# Patient Record
Sex: FEMALE | Race: ASIAN | NOT HISPANIC OR LATINO
[De-identification: names, ages, dates, MRNs, and addresses within clinical notes are randomized per-mention and may not be internally consistent; named-entity substitution may affect disease eponyms.]

---

## 2020-12-29 ENCOUNTER — TRANSCRIPTION ENCOUNTER (OUTPATIENT)
Age: 40
End: 2020-12-29

## 2021-01-26 PROBLEM — Z00.00 ENCOUNTER FOR PREVENTIVE HEALTH EXAMINATION: Status: ACTIVE | Noted: 2021-01-26

## 2021-01-27 ENCOUNTER — APPOINTMENT (OUTPATIENT)
Dept: MAMMOGRAPHY | Facility: CLINIC | Age: 41
End: 2021-01-27
Payer: MEDICAID

## 2021-01-27 ENCOUNTER — OUTPATIENT (OUTPATIENT)
Dept: OUTPATIENT SERVICES | Facility: HOSPITAL | Age: 41
LOS: 1 days | End: 2021-01-27
Payer: MEDICAID

## 2021-01-27 DIAGNOSIS — Z00.00 ENCOUNTER FOR GENERAL ADULT MEDICAL EXAMINATION WITHOUT ABNORMAL FINDINGS: ICD-10-CM

## 2021-01-27 PROCEDURE — 77067 SCR MAMMO BI INCL CAD: CPT | Mod: 26

## 2021-01-27 PROCEDURE — 77063 BREAST TOMOSYNTHESIS BI: CPT | Mod: 26

## 2021-01-27 PROCEDURE — 77063 BREAST TOMOSYNTHESIS BI: CPT

## 2021-01-27 PROCEDURE — 77067 SCR MAMMO BI INCL CAD: CPT

## 2021-02-10 ENCOUNTER — OUTPATIENT (OUTPATIENT)
Dept: OUTPATIENT SERVICES | Facility: HOSPITAL | Age: 41
LOS: 1 days | End: 2021-02-10
Payer: MEDICAID

## 2021-02-10 ENCOUNTER — APPOINTMENT (OUTPATIENT)
Dept: ULTRASOUND IMAGING | Facility: CLINIC | Age: 41
End: 2021-02-10
Payer: MEDICAID

## 2021-02-10 DIAGNOSIS — Z00.8 ENCOUNTER FOR OTHER GENERAL EXAMINATION: ICD-10-CM

## 2021-02-10 PROCEDURE — 76641 ULTRASOUND BREAST COMPLETE: CPT | Mod: 26,50

## 2021-02-10 PROCEDURE — 76641 ULTRASOUND BREAST COMPLETE: CPT

## 2023-04-26 ENCOUNTER — OUTPATIENT (OUTPATIENT)
Dept: OUTPATIENT SERVICES | Facility: HOSPITAL | Age: 43
LOS: 1 days | End: 2023-04-26
Payer: MEDICAID

## 2023-04-26 VITALS
OXYGEN SATURATION: 100 % | HEIGHT: 60 IN | DIASTOLIC BLOOD PRESSURE: 76 MMHG | SYSTOLIC BLOOD PRESSURE: 119 MMHG | WEIGHT: 126.99 LBS | RESPIRATION RATE: 15 BRPM | HEART RATE: 62 BPM | TEMPERATURE: 98 F

## 2023-04-26 DIAGNOSIS — K80.21 CALCULUS OF GALLBLADDER WITHOUT CHOLECYSTITIS WITH OBSTRUCTION: ICD-10-CM

## 2023-04-26 DIAGNOSIS — Z78.9 OTHER SPECIFIED HEALTH STATUS: Chronic | ICD-10-CM

## 2023-04-26 DIAGNOSIS — Z01.818 ENCOUNTER FOR OTHER PREPROCEDURAL EXAMINATION: ICD-10-CM

## 2023-04-26 LAB
ALBUMIN SERPL ELPH-MCNC: 4.1 G/DL — SIGNIFICANT CHANGE UP (ref 3.5–5)
ALP SERPL-CCNC: 103 U/L — SIGNIFICANT CHANGE UP (ref 40–120)
ALT FLD-CCNC: 44 U/L DA — SIGNIFICANT CHANGE UP (ref 10–60)
ANION GAP SERPL CALC-SCNC: 6 MMOL/L — SIGNIFICANT CHANGE UP (ref 5–17)
APTT BLD: 33.8 SEC — SIGNIFICANT CHANGE UP (ref 27.5–35.5)
AST SERPL-CCNC: 24 U/L — SIGNIFICANT CHANGE UP (ref 10–40)
BILIRUB SERPL-MCNC: 0.4 MG/DL — SIGNIFICANT CHANGE UP (ref 0.2–1.2)
BLD GP AB SCN SERPL QL: SIGNIFICANT CHANGE UP
BUN SERPL-MCNC: 10 MG/DL — SIGNIFICANT CHANGE UP (ref 7–18)
CALCIUM SERPL-MCNC: 10 MG/DL — SIGNIFICANT CHANGE UP (ref 8.4–10.5)
CHLORIDE SERPL-SCNC: 108 MMOL/L — SIGNIFICANT CHANGE UP (ref 96–108)
CO2 SERPL-SCNC: 27 MMOL/L — SIGNIFICANT CHANGE UP (ref 22–31)
CREAT SERPL-MCNC: 0.75 MG/DL — SIGNIFICANT CHANGE UP (ref 0.5–1.3)
EGFR: 101 ML/MIN/1.73M2 — SIGNIFICANT CHANGE UP
GLUCOSE SERPL-MCNC: 102 MG/DL — HIGH (ref 70–99)
HCT VFR BLD CALC: 42.1 % — SIGNIFICANT CHANGE UP (ref 34.5–45)
HGB BLD-MCNC: 12.5 G/DL — SIGNIFICANT CHANGE UP (ref 11.5–15.5)
INR BLD: 1.02 RATIO — SIGNIFICANT CHANGE UP (ref 0.88–1.16)
MCHC RBC-ENTMCNC: 25.6 PG — LOW (ref 27–34)
MCHC RBC-ENTMCNC: 29.7 GM/DL — LOW (ref 32–36)
MCV RBC AUTO: 86.1 FL — SIGNIFICANT CHANGE UP (ref 80–100)
NRBC # BLD: 0 /100 WBCS — SIGNIFICANT CHANGE UP (ref 0–0)
PLATELET # BLD AUTO: 141 K/UL — LOW (ref 150–400)
POTASSIUM SERPL-MCNC: 3.9 MMOL/L — SIGNIFICANT CHANGE UP (ref 3.5–5.3)
POTASSIUM SERPL-SCNC: 3.9 MMOL/L — SIGNIFICANT CHANGE UP (ref 3.5–5.3)
PROT SERPL-MCNC: 8.4 G/DL — HIGH (ref 6–8.3)
PROTHROM AB SERPL-ACNC: 12.2 SEC — SIGNIFICANT CHANGE UP (ref 10.5–13.4)
RBC # BLD: 4.89 M/UL — SIGNIFICANT CHANGE UP (ref 3.8–5.2)
RBC # FLD: 13.5 % — SIGNIFICANT CHANGE UP (ref 10.3–14.5)
SODIUM SERPL-SCNC: 141 MMOL/L — SIGNIFICANT CHANGE UP (ref 135–145)
WBC # BLD: 5.72 K/UL — SIGNIFICANT CHANGE UP (ref 3.8–10.5)
WBC # FLD AUTO: 5.72 K/UL — SIGNIFICANT CHANGE UP (ref 3.8–10.5)

## 2023-04-26 PROCEDURE — G0463: CPT

## 2023-04-26 NOTE — H&P PST ADULT - HISTORY OF PRESENT ILLNESS
43 yr old female with no medical history menopausal for 14 months, presents with calculus gallbladder without cholecystitis with obstruction. Pt c/o of UR Quadrant pain 6/10 no medication taken and epigastric pain with nausea early morning. Ultrasound done and viewed by surgeon. Pt scheduled for Laparoscopic cholecystectomy possible open on 5/10/2023. All pre op blood work performed today.

## 2023-04-26 NOTE — H&P PST ADULT - NSICDXFAMILYHX_GEN_ALL_CORE_FT
FAMILY HISTORY:  Father  Still living? Yes, Estimated age: 73  Known health problems: none, Age at diagnosis: Age Unknown    Mother  Still living? Yes, Estimated age: 63  Known health problems: none, Age at diagnosis: Age Unknown

## 2023-04-26 NOTE — H&P PST ADULT - PROBLEM SELECTOR PLAN 1
scheduled for Laparoscopic cholecystectomy possible open    Pt instructed to be NPO the night before and the morning of surgery. Provided with chlorhexidene 4% solution to wash for 3 days including the morning of surgery. Written instructions given and reviewed for clarity with pt directions. Escort required. Tylenol only to be used prior to surgery    Stop Bang Score= 0 Pt is low risk for SARAH

## 2023-05-09 ENCOUNTER — TRANSCRIPTION ENCOUNTER (OUTPATIENT)
Age: 43
End: 2023-05-09

## 2023-05-10 ENCOUNTER — TRANSCRIPTION ENCOUNTER (OUTPATIENT)
Age: 43
End: 2023-05-10

## 2023-05-10 ENCOUNTER — OUTPATIENT (OUTPATIENT)
Dept: OUTPATIENT SERVICES | Facility: HOSPITAL | Age: 43
LOS: 1 days | End: 2023-05-10
Payer: MEDICAID

## 2023-05-10 VITALS
SYSTOLIC BLOOD PRESSURE: 100 MMHG | RESPIRATION RATE: 16 BRPM | OXYGEN SATURATION: 100 % | HEART RATE: 59 BPM | TEMPERATURE: 98 F | DIASTOLIC BLOOD PRESSURE: 64 MMHG

## 2023-05-10 VITALS
WEIGHT: 126.99 LBS | HEART RATE: 70 BPM | HEIGHT: 60 IN | TEMPERATURE: 98 F | SYSTOLIC BLOOD PRESSURE: 123 MMHG | RESPIRATION RATE: 16 BRPM | OXYGEN SATURATION: 100 % | DIASTOLIC BLOOD PRESSURE: 85 MMHG

## 2023-05-10 DIAGNOSIS — K80.21 CALCULUS OF GALLBLADDER WITHOUT CHOLECYSTITIS WITH OBSTRUCTION: ICD-10-CM

## 2023-05-10 DIAGNOSIS — Z78.9 OTHER SPECIFIED HEALTH STATUS: Chronic | ICD-10-CM

## 2023-05-10 LAB
BLD GP AB SCN SERPL QL: SIGNIFICANT CHANGE UP
HCG UR QL: NEGATIVE — SIGNIFICANT CHANGE UP

## 2023-05-10 PROCEDURE — 88304 TISSUE EXAM BY PATHOLOGIST: CPT | Mod: 26

## 2023-05-10 PROCEDURE — 86901 BLOOD TYPING SEROLOGIC RH(D): CPT

## 2023-05-10 PROCEDURE — 86900 BLOOD TYPING SEROLOGIC ABO: CPT

## 2023-05-10 PROCEDURE — 36415 COLL VENOUS BLD VENIPUNCTURE: CPT

## 2023-05-10 PROCEDURE — 86850 RBC ANTIBODY SCREEN: CPT

## 2023-05-10 PROCEDURE — C1889: CPT

## 2023-05-10 PROCEDURE — 81025 URINE PREGNANCY TEST: CPT

## 2023-05-10 PROCEDURE — 88304 TISSUE EXAM BY PATHOLOGIST: CPT

## 2023-05-10 PROCEDURE — 47562 LAPAROSCOPIC CHOLECYSTECTOMY: CPT

## 2023-05-10 DEVICE — LIGATING CLIPS WECK HORIZON MEDIUM-LARGE (GREEN) 6: Type: IMPLANTABLE DEVICE | Status: FUNCTIONAL

## 2023-05-10 RX ORDER — SODIUM CHLORIDE 9 MG/ML
1000 INJECTION, SOLUTION INTRAVENOUS
Refills: 0 | Status: DISCONTINUED | OUTPATIENT
Start: 2023-05-10 | End: 2023-05-10

## 2023-05-10 RX ORDER — ONDANSETRON 8 MG/1
4 TABLET, FILM COATED ORAL ONCE
Refills: 0 | Status: DISCONTINUED | OUTPATIENT
Start: 2023-05-10 | End: 2023-05-24

## 2023-05-10 RX ORDER — SODIUM CHLORIDE 9 MG/ML
3 INJECTION INTRAMUSCULAR; INTRAVENOUS; SUBCUTANEOUS EVERY 8 HOURS
Refills: 0 | Status: DISCONTINUED | OUTPATIENT
Start: 2023-05-10 | End: 2023-05-10

## 2023-05-10 RX ORDER — HYDROMORPHONE HYDROCHLORIDE 2 MG/ML
0.5 INJECTION INTRAMUSCULAR; INTRAVENOUS; SUBCUTANEOUS
Refills: 0 | Status: DISCONTINUED | OUTPATIENT
Start: 2023-05-10 | End: 2023-05-10

## 2023-05-10 RX ORDER — OXYCODONE AND ACETAMINOPHEN 5; 325 MG/1; MG/1
1 TABLET ORAL
Qty: 12 | Refills: 0
Start: 2023-05-10 | End: 2023-05-12

## 2023-05-10 RX ORDER — HYDROMORPHONE HYDROCHLORIDE 2 MG/ML
1 INJECTION INTRAMUSCULAR; INTRAVENOUS; SUBCUTANEOUS
Refills: 0 | Status: DISCONTINUED | OUTPATIENT
Start: 2023-05-10 | End: 2023-05-10

## 2023-05-10 RX ORDER — ONDANSETRON 8 MG/1
4 TABLET, FILM COATED ORAL ONCE
Refills: 0 | Status: DISCONTINUED | OUTPATIENT
Start: 2023-05-10 | End: 2023-05-10

## 2023-05-10 RX ORDER — ACETAMINOPHEN 500 MG
650 TABLET ORAL ONCE
Refills: 0 | Status: DISCONTINUED | OUTPATIENT
Start: 2023-05-10 | End: 2023-05-24

## 2023-05-10 RX ADMIN — HYDROMORPHONE HYDROCHLORIDE 0.5 MILLIGRAM(S): 2 INJECTION INTRAMUSCULAR; INTRAVENOUS; SUBCUTANEOUS at 12:29

## 2023-05-10 RX ADMIN — HYDROMORPHONE HYDROCHLORIDE 0.5 MILLIGRAM(S): 2 INJECTION INTRAMUSCULAR; INTRAVENOUS; SUBCUTANEOUS at 13:00

## 2023-05-10 NOTE — ASU PATIENT PROFILE, ADULT - FALL HARM RISK - UNIVERSAL INTERVENTIONS
Bed in lowest position, wheels locked, appropriate side rails in place/Call bell, personal items and telephone in reach/Instruct patient to call for assistance before getting out of bed or chair/Non-slip footwear when patient is out of bed/[place] to call system/Physically safe environment - no spills, clutter or unnecessary equipment/Purposeful Proactive Rounding/Room/bathroom lighting operational, light cord in reach

## 2023-05-10 NOTE — ASU DISCHARGE PLAN (ADULT/PEDIATRIC) - ASU DC SPECIAL INSTRUCTIONSFT
Please take over the counter Motrin as needed for pain.    Please take Motrin (ibuprofen) 600mg every 6 hours as needed for pain.  Do not exceed more than 2400mg Motrin (ibuprofen) in 24 hours.     For pain not well controlled with Motrin, please take Percocet 5-325mg every 6 hours as needed for more severe pain.      No heavy lifting for 4-6 weeks.      May resume normal diet    Please shower only starting tomorrow.  May remove clear outer dressing in 48 hours, no not remove strips from on top of wounds, these will fall off on their own.     Resume all home medications as directed.      Please keep all follow up appointments as directed.  Please follow up with Dr. Baumann in clinic in 1 week.

## 2023-05-10 NOTE — ASU DISCHARGE PLAN (ADULT/PEDIATRIC) - NS MD DC FALL RISK RISK
For information on Fall & Injury Prevention, visit: [email or web address] OR  [email or web address] OR  https://www.cdc.gov/steadi/patient.html

## 2023-05-10 NOTE — ASU DISCHARGE PLAN (ADULT/PEDIATRIC) - CARE PROVIDER_API CALL
Arturo Baumann)  ColonRectal Surgery; [place]  [address] [place] [address]  Phone: [phone]  Fax: [phone]  Follow Up Time: 1 week

## 2023-05-16 LAB — SURGICAL PATHOLOGY STUDY: SIGNIFICANT CHANGE UP

## 2023-07-12 ENCOUNTER — NON-APPOINTMENT (OUTPATIENT)
Age: 43
End: 2023-07-12

## 2023-07-12 DIAGNOSIS — K83.8 OTHER SPECIFIED DISEASES OF BILIARY TRACT: ICD-10-CM

## 2023-07-12 DIAGNOSIS — K83.1 OBSTRUCTION OF BILE DUCT: ICD-10-CM

## 2023-07-12 PROBLEM — K80.21 CALCULUS OF GALLBLADDER WITHOUT CHOLECYSTITIS WITH OBSTRUCTION: Chronic | Status: ACTIVE | Noted: 2023-04-26

## 2023-07-18 ENCOUNTER — OUTPATIENT (OUTPATIENT)
Dept: OUTPATIENT SERVICES | Facility: HOSPITAL | Age: 43
LOS: 1 days | End: 2023-07-18
Payer: MEDICAID

## 2023-07-18 VITALS
SYSTOLIC BLOOD PRESSURE: 128 MMHG | RESPIRATION RATE: 16 BRPM | OXYGEN SATURATION: 97 % | DIASTOLIC BLOOD PRESSURE: 84 MMHG | HEIGHT: 59 IN | HEART RATE: 66 BPM | WEIGHT: 128.09 LBS | TEMPERATURE: 98 F

## 2023-07-18 DIAGNOSIS — K83.8 OTHER SPECIFIED DISEASES OF BILIARY TRACT: ICD-10-CM

## 2023-07-18 DIAGNOSIS — Z90.49 ACQUIRED ABSENCE OF OTHER SPECIFIED PARTS OF DIGESTIVE TRACT: Chronic | ICD-10-CM

## 2023-07-18 DIAGNOSIS — Z78.9 OTHER SPECIFIED HEALTH STATUS: Chronic | ICD-10-CM

## 2023-07-18 DIAGNOSIS — K83.1 OBSTRUCTION OF BILE DUCT: ICD-10-CM

## 2023-07-18 LAB
ALBUMIN SERPL ELPH-MCNC: 4.7 G/DL — SIGNIFICANT CHANGE UP (ref 3.3–5)
ALP SERPL-CCNC: 92 U/L — SIGNIFICANT CHANGE UP (ref 40–120)
ALT FLD-CCNC: 28 U/L — SIGNIFICANT CHANGE UP (ref 4–33)
ANION GAP SERPL CALC-SCNC: 12 MMOL/L — SIGNIFICANT CHANGE UP (ref 7–14)
AST SERPL-CCNC: 18 U/L — SIGNIFICANT CHANGE UP (ref 4–32)
BILIRUB SERPL-MCNC: <0.2 MG/DL — SIGNIFICANT CHANGE UP (ref 0.2–1.2)
BUN SERPL-MCNC: 10 MG/DL — SIGNIFICANT CHANGE UP (ref 7–23)
CALCIUM SERPL-MCNC: 9.5 MG/DL — SIGNIFICANT CHANGE UP (ref 8.4–10.5)
CHLORIDE SERPL-SCNC: 105 MMOL/L — SIGNIFICANT CHANGE UP (ref 98–107)
CO2 SERPL-SCNC: 25 MMOL/L — SIGNIFICANT CHANGE UP (ref 22–31)
CREAT SERPL-MCNC: 0.62 MG/DL — SIGNIFICANT CHANGE UP (ref 0.5–1.3)
EGFR: 113 ML/MIN/1.73M2 — SIGNIFICANT CHANGE UP
GLUCOSE SERPL-MCNC: 119 MG/DL — HIGH (ref 70–99)
HCG SERPL-ACNC: <1 MIU/ML — SIGNIFICANT CHANGE UP
HCT VFR BLD CALC: 39.9 % — SIGNIFICANT CHANGE UP (ref 34.5–45)
HGB BLD-MCNC: 11.9 G/DL — SIGNIFICANT CHANGE UP (ref 11.5–15.5)
MCHC RBC-ENTMCNC: 25.3 PG — LOW (ref 27–34)
MCHC RBC-ENTMCNC: 29.8 GM/DL — LOW (ref 32–36)
MCV RBC AUTO: 84.7 FL — SIGNIFICANT CHANGE UP (ref 80–100)
NRBC # BLD: 0 /100 WBCS — SIGNIFICANT CHANGE UP (ref 0–0)
NRBC # FLD: 0 K/UL — SIGNIFICANT CHANGE UP (ref 0–0)
PLATELET # BLD AUTO: 139 K/UL — LOW (ref 150–400)
POTASSIUM SERPL-MCNC: 3.9 MMOL/L — SIGNIFICANT CHANGE UP (ref 3.5–5.3)
POTASSIUM SERPL-SCNC: 3.9 MMOL/L — SIGNIFICANT CHANGE UP (ref 3.5–5.3)
PROT SERPL-MCNC: 7.3 G/DL — SIGNIFICANT CHANGE UP (ref 6–8.3)
RBC # BLD: 4.71 M/UL — SIGNIFICANT CHANGE UP (ref 3.8–5.2)
RBC # FLD: 13.8 % — SIGNIFICANT CHANGE UP (ref 10.3–14.5)
SODIUM SERPL-SCNC: 142 MMOL/L — SIGNIFICANT CHANGE UP (ref 135–145)
WBC # BLD: 4.98 K/UL — SIGNIFICANT CHANGE UP (ref 3.8–10.5)
WBC # FLD AUTO: 4.98 K/UL — SIGNIFICANT CHANGE UP (ref 3.8–10.5)

## 2023-07-18 PROCEDURE — 93010 ELECTROCARDIOGRAM REPORT: CPT

## 2023-07-18 RX ORDER — SODIUM CHLORIDE 9 MG/ML
3 INJECTION INTRAMUSCULAR; INTRAVENOUS; SUBCUTANEOUS EVERY 8 HOURS
Refills: 0 | Status: DISCONTINUED | OUTPATIENT
Start: 2023-07-25 | End: 2023-08-09

## 2023-07-18 RX ORDER — PREGABALIN 225 MG/1
1 CAPSULE ORAL
Refills: 0 | DISCHARGE

## 2023-07-18 RX ORDER — SODIUM CHLORIDE 9 MG/ML
1000 INJECTION, SOLUTION INTRAVENOUS
Refills: 0 | Status: DISCONTINUED | OUTPATIENT
Start: 2023-07-25 | End: 2023-08-09

## 2023-07-18 RX ORDER — CHOLECALCIFEROL (VITAMIN D3) 125 MCG
1 CAPSULE ORAL
Refills: 0 | DISCHARGE

## 2023-07-18 NOTE — H&P PST ADULT - FUNCTIONAL STATUS
Patient reports they are able to walk 10-15 minutes at a normal pace, independent with ADLs, and able to climb 2 flight of stairs   Denies chest pain, shortness of breath, palpitations, weakness, headaches or changes in vision/4-10 METS

## 2023-07-18 NOTE — H&P PST ADULT - PROBLEM SELECTOR PLAN 1
Patient scheduled for surgery on: 7/25/23  Patient provided with verbal and written presurgical instructions      Lab specimen drawn at PST today: cbc, cmp, hcg    Medical  evaluation requested by PST:  , will obtain  ***Comparison EKG, Echo and Stress test requested      Problem: Pre-menopausal   Urine specimen cup provided, UCG on DOS Patient scheduled for surgery on: 7/25/23  Patient provided with verbal and written presurgical instructions    Lab specimen drawn at PST today: cbc, cmp, hcg    ***Comparison EKG, Echo and Stress test requested    Problem: Pre-menopausal   Urine specimen cup provided, UCG on DOS

## 2023-07-18 NOTE — H&P PST ADULT - NSICDXPASTMEDICALHX_GEN_ALL_CORE_FT
PAST MEDICAL HISTORY:  Calculus of gallbladder without cholecystitis with obstruction     Obstruction of bile duct

## 2023-07-18 NOTE — H&P PST ADULT - ANESTHESIA, PREVIOUS REACTION, PROFILE
Mallampati:   Denies loose teeth, veneers and dentures/none Mallampati: IV  Denies loose teeth, veneers and dentures/none

## 2023-07-18 NOTE — H&P PST ADULT - HISTORY OF PRESENT ILLNESS
43 year female with history of gallstones, endoscopy and MRI revealed abnormal biliary imaging with dilation of CBD in setting of cholelithiases . S/p lap. cholecystectomy; Scheduled for EUS/ERCP to further evaluate.

## 2023-07-18 NOTE — H&P PST ADULT - SKIN/BREAST
I spoke to Kelly today as she was in the office. Feels her father is better. Advised appt if not continuing to improve   negative

## 2023-07-18 NOTE — H&P PST ADULT - EKG AND INTERPRETATION
in chart in chart  Patient reports she had an abnormal EKG prior to lap cholecystectomy- followed by stress and echo

## 2023-07-25 ENCOUNTER — OUTPATIENT (OUTPATIENT)
Dept: OUTPATIENT SERVICES | Facility: HOSPITAL | Age: 43
LOS: 1 days | Discharge: HOME | End: 2023-07-25
Payer: MEDICAID

## 2023-07-25 ENCOUNTER — TRANSCRIPTION ENCOUNTER (OUTPATIENT)
Age: 43
End: 2023-07-25

## 2023-07-25 ENCOUNTER — NON-APPOINTMENT (OUTPATIENT)
Age: 43
End: 2023-07-25

## 2023-07-25 ENCOUNTER — APPOINTMENT (OUTPATIENT)
Dept: GASTROENTEROLOGY | Facility: HOSPITAL | Age: 43
End: 2023-07-25

## 2023-07-25 VITALS
DIASTOLIC BLOOD PRESSURE: 67 MMHG | OXYGEN SATURATION: 97 % | SYSTOLIC BLOOD PRESSURE: 101 MMHG | HEART RATE: 60 BPM | RESPIRATION RATE: 15 BRPM

## 2023-07-25 VITALS
TEMPERATURE: 98 F | DIASTOLIC BLOOD PRESSURE: 65 MMHG | RESPIRATION RATE: 15 BRPM | OXYGEN SATURATION: 99 % | SYSTOLIC BLOOD PRESSURE: 125 MMHG | WEIGHT: 126.99 LBS | HEART RATE: 65 BPM | HEIGHT: 60 IN

## 2023-07-25 DIAGNOSIS — Z90.49 ACQUIRED ABSENCE OF OTHER SPECIFIED PARTS OF DIGESTIVE TRACT: Chronic | ICD-10-CM

## 2023-07-25 DIAGNOSIS — K83.8 OTHER SPECIFIED DISEASES OF BILIARY TRACT: ICD-10-CM

## 2023-07-25 PROCEDURE — 43259 EGD US EXAM DUODENUM/JEJUNUM: CPT | Mod: GC

## 2023-07-25 RX ORDER — SODIUM CHLORIDE 9 MG/ML
500 INJECTION, SOLUTION INTRAVENOUS
Refills: 0 | Status: DISCONTINUED | OUTPATIENT
Start: 2023-07-25 | End: 2023-08-09

## 2023-07-25 NOTE — ASU PATIENT PROFILE, ADULT - NSICDXPASTMEDICALHX_GEN_ALL_CORE_FT
PAST MEDICAL HISTORY:  Anemia     Calculus of gallbladder without cholecystitis with obstruction     Obstruction of bile duct

## 2023-07-25 NOTE — ASU DISCHARGE PLAN (ADULT/PEDIATRIC) - NS MD DC FALL RISK RISK
For information on Fall & Injury Prevention, visit: [email or web address] OR  [email or web address] OR  https://www.cdc.gov/steadi/patient.html

## 2023-07-25 NOTE — PRE PROCEDURE NOTE - PRE PROCEDURE EVALUATION
Attending Physician:   Dr. Rojas    Procedure: EUS+/-ERCP    Indication for Procedure:  Abd pain with hx of lap vern  ________________________________________________________  PAST MEDICAL & SURGICAL HISTORY:  Calculus of gallbladder without cholecystitis with obstruction      Obstruction of bile duct      Anemia      S/P cholecystectomy        ALLERGIES:  No Known Allergies    HOME MEDICATIONS:  Iron one tab once a week:   Vitamin B12 100 mcg oral tablet: 1 orally once a week  Vitamin D3 125 mcg (5000 intl units) oral tablet: 1 orally once a week    AICD/PPM: [ ] yes   [x ] no    PERTINENT LAB DATA:                      PHYSICAL EXAMINATION:    T(C): --  HR: --  BP: --  RR: --  SpO2: --    Constitutional: NAD  HEENT: PERRLA, EOMI,    Neck:  No JVD  Respiratory: CTAB/L  Cardiovascular: S1 and S2  Gastrointestinal: BS+, soft, NT/ND  Extremities: No peripheral edema  Neurological: A/O x 3, no focal deficits  Psychiatric: Normal mood, normal affect  Skin: No rashes    ASA Class: I [ ]  II [ x]  III [ ]  IV [ ]    COMMENTS:    The patient is a suitable candidate for the planned procedure unless box checked [ ]  No, explain:    I explained the risks (bleeding, infection, perforation, pancreatitis, CV risk, anesthesia risk)/b/a with the patient. The patient agrees to proceed.

## 2023-10-04 NOTE — ASU PATIENT PROFILE, ADULT - NSICDXPASTSURGICALHX_GEN_ALL_CORE_FT
Render In Strict Bullet Format?: No
Plan: Patient declined topicals today.
Detail Level: Zone
PAST SURGICAL HISTORY:  S/P cholecystectomy

## 2023-12-05 NOTE — ASU PATIENT PROFILE, ADULT - AS SC BRADEN ACTIVITY
December 5, 2023       Elio Stephens III, MD  [address]  [facility] [place] [address]  Via In Basket      Patient: Adelina Ricks   YOB: 1945   Date of Visit: 12/5/2023       Dear Dr. Stephens:    Thank you for referring Dorian Ricks to me for evaluation. Below are my notes for this visit with her.    If you have questions, please do not hesitate to call me. I look forward to following your patient along with you.      Sincerely,        Joseph Tello MD        CC: No Recipients  Joseph Tello MD  12/5/2023  2:34 PM  Signed        Advanced Heart Failure Office Note      Date of Visit:  12/5/2023  Patient Name:  Adelina Ricks  Medical Record Number:  [id number]    YOB: 1945   Primary Physician:  Elio Stephens III, MD.         History of Presenting Illness:    Adelina Ricks is a 78 year old female with a history of right below the knee amputation, coronary artery disease, two-vessel bypass surgery 2016, recurrent systolic heart failure exacerbation, stable vein graft anatomy, mild mitral stenosis, EF 45 to 50%, s/p NIDA 11/2021, MG 19 mmHG, BIV PPM, status post spinal cord stimulator for pain management, paroxysmal atrial fibrillation, who comes for follow-up.  Since her last visit, the patient attempted to go to the heart failure clinic though did not enjoy her response due to need for multiple sticks and IV diuretic dosing.  She felt like they have had no change in her clinical status.  She feels overall clinically stable at her current weight of 195 pounds.  She does have some residual lower extremity edema.  Her NT proBNP has increased though clinically she denies significant shortness of breath or orthopnea.      Previous dry weight: 176 pounds.  Serum creatinine: 1.2  NTBNP: 6217            HEART FAILURE MEDICATIONS:   [] ACEi [x] ARB [] ARNI :  [] BB (beta blocker):  [] Corlanor:  [x] DIG (digoxin):  [x] Diuretic:   [] Hydralazine [] Nitrate:    (4) walks frequently   [x] MRA:   [x] SGLT2i:    DEVICES:   [] ICD (implantable cardioverter-defibrillator)   [x] BIV (biventricular) - ICD  [] PPM (permanent pacemaker)  [] Life Vest   [] CardioMEMS            New York Heart Association Classification:   NYHA Class III      REVIEW OF SYSTEMS:    Review of Systems   Constitutional: Negative for chills, fever, weight gain and weight loss.   HENT:  Positive for hearing loss.    Eyes:         Patient denies significant visual changes   Cardiovascular:  Positive for leg swelling (1+ LE edema wears compressions stockings). Negative for chest pain, claudication and palpitations.        Negative except for what's indicated in HPI   Respiratory:  Positive for sleep disturbances due to breathing (currently uses CPAP machine). Negative for cough, hemoptysis and shortness of breath.    Hematologic/Lymphatic: Does not bruise/bleed easily.   Skin:  Positive for dry skin (mouth). Negative for rash and suspicious lesions.   Musculoskeletal:  Positive for back pain. Negative for arthritis, joint pain, joint swelling, muscle cramps and muscle weakness.        Right BKA, neuroma pain   Gastrointestinal:  Positive for bloating. Negative for abdominal pain, constipation, diarrhea, hematochezia and melena.   Genitourinary:  Positive for incomplete emptying. Negative for hematuria.   Neurological:  Positive for loss of balance ( Right below the knee ambutation). Negative for dizziness, focal weakness, headaches (Rt below knee amputation) and light-headedness.        No localized deficits   Psychiatric/Behavioral:  Negative for depression. The patient has insomnia (trouble staying asleep).    Allergic/Immunologic: Negative for environmental allergies.        No new food allergies       PMHx:  Past Medical History:   Diagnosis Date   • Acute osteomyelitis of right foot (CMD) 09/14/2015   • MINE (acute kidney injury) (CMD) 1/7/2023   • Amputated right leg (CMD)    • Arthritis    • Atrial fibrillation (CMD)    •  Atrioventricular block     2/97 [facility]/nonobstructive disease, 3rd degree AV block, 5/08, 6/12   • Carotid artery stenosis, asymptomatic, unspecified laterality    • Complex regional pain syndrome I    • Coronary artery disease    • Diabetes mellitus (CMD)    • Diabetic neuropathy (CMD)      diabetic neuropathy lower extremities   • Dyslipidemia    • Essential hypertension    • High cholesterol    • Hyperlipoproteinemia    • Malignant neoplasm (CMD)     breast   • MI, acute, non ST segment elevation (CMD) 03/03/2016 1/2016   • Neuroma of amputation stump, right lower extremity (CMD) 06/15/2021   • Post-operative infection 03/24/2020    [facility] Davis:Right foot/leg   • Sleep apnea     uses CPAP, setting 9   • Thyroid disease    • Vitamin D deficiency        PSHx:  Past Surgical History:   Procedure Laterality Date   • Amputation      Rt. great toe 07/15   • Anes cataract surgery,complex     • Breast lumpectomy Left    • Cardiac device check - in clinic      [employer] pacemaker 5/16 - upgrade to Bi-V pacer 11/18   • Cardiac surgery     • Cardioversion  01/2023   • Cardioversion  03/2023   • Case request clinic to cath/vasc      January 2016   • Colectomy       colon resection 1/13   • Coronary artery bypass graft     • Cystoscopy     • Finger amputation Right 01/08/2023    Middle Finger; Partial   • Finger surgery  2013   • Hysterectomy     • Joint replacement Bilateral     knee   • Knee surgery Right 11/2004   • Other surgical history      [id number]   • Pancreas surgery  08/16/2022    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 02/10/16.   • Pr drug-eluting stents, single      diagonal, drug eluting  stent February 2016,   • Revision of eyelid,< 1/4 lid margin     • Saphenous vein graft resection      saphenous vein graft to the RV lateral branch 02/19/16   • Total knee replacement Bilateral          Allergies:   ALLERGIES:   Allergen Reactions   • Alprazolam RASH   • Clindamycin Other (See Comments)   • Levofloxacin Other (See Comments)   • Sulfa Antibiotics Other (See Comments)   • Vancomycin PRURITUS   • Epinephrine Other (See Comments)     Jittery, rapid heart beat   • Spironolactone PRURITUS   • Adhesive   (Environmental) Other (See Comments)     Cloth bandage Causes skin breakdown.    • Erythromycin DIARRHEA     diarrhea     • Hydrocodone-Acetaminophen ANXIETY   • Tetracycline DIARRHEA   • Valacyclovir Hcl GI UPSET              Medications:  Current Outpatient Medications   Medication Sig Dispense Refill   • insulin detemir (LEVEMIR) 100 UNIT/ML injectable solution Inject 30 Units into the skin in the morning and 30 Units in the evening. 60 mL 1   • Insulin Lispro (HUMALOG IJ) Inject 15 Units as directed in the morning and 15 Units at noon and 15 Units in the evening.     • tobramycin (TOBREX) 0.3 % ophthalmic solution Inject 1 drop into the eye in the morning and 1 drop at noon and 1 drop in the evening.     • erythromycin (ILOTYCIN) ophthalmic ointment Place 1 inch into left eye at bedtime.     • ipratropium (ATROVENT) 0.06 % nasal spray Spray 1 spray in each nostril as needed.     • empagliflozin (Jardiance) 25 MG tablet Take 1 tablet by mouth daily (before breakfast). Indications: Type 2 Diabetes 90 tablet 0   • potassium chloride (Klor-Con M) 10 MEQ britni ER tablet Take 2 tablets by mouth daily. 180 tablet 1   • torsemide (DEMADEX) 20 MG tablet Take 20 mg by mouth as directed. 60mg in AM and 20mg in PM 60 tablet 3   • pregabalin (LYRICA) 100 MG capsule TAKE 1 CAPSULE BY MOUTH THREE TIMES A  capsule 3   • Eliquis 5 MG Tab TAKE 1 TABLET BY MOUTH EVERY 12 HOURS DO NOT START BEFORE 1/18/22. START  AFTER PROCEDURE 1/17/22 180 tablet 3   • atorvastatin (LIPITOR) 80 MG tablet TAKE 1 TABLET BY MOUTH EVERY DAY 90 tablet 3   • cyclobenzaprine (FLEXERIL) 10 MG tablet Take 1 tablet by mouth 3 times daily as needed for Muscle spasms. 30 tablet 0   • hydrOXYzine (ATARAX) 50 MG tablet Take 1 tablet by mouth every 6 hours as needed for Anxiety. 30 tablet 1   • levothyroxine 150 MCG tablet Take 1 tablet by mouth daily.     • clopidogrel (PLAVIX) 75 MG tablet Take 1 tablet by mouth daily. Indications: Coronary Bypass Surgery 90 tablet 3   • losartan (COZAAR) 25 MG tablet TAKE 1/2 TABLET BY MOUTH EVERY DAY 45 tablet 3   • traMADol (ULTRAM) 50 MG tablet TAKE 1 TABLET BY MOUTH EVERY 6 HOURS AS NEEDED FOR PAIN 20 tablet 0   • Insulin Syringe-Needle U-100 (BD Insulin Syringe U/F) 31G X 5/16\" 0.5 ML Misc 1 each 4 times daily. 200 each 11   • Accu-Chek Guide test strip CHECK BLOOD SUGAR THREE TIMES DAILY 300 strip 3   • Omega-3 Fatty Acids (FISH OIL OMEGA-3 PO) Take 4,000 mg by mouth daily.     • Polyethylene Glycol 400 (BLINK TEARS OP) Apply 1 drop to eye as needed (dry eyes).     • Insulin Pen Needle 32G X 4 MM Misc Use as directed 50 each 3   • Blood Glucose Monitoring Suppl (Accu-Chek Guide) w/Device Kit accu-chek guide  w/device kit     • B Complex Vitamins (B COMPLEX PO) Take 1 tablet by mouth daily.       No current facility-administered medications for this visit.         OBJECTIVE:  Physical Exam:  Vitals: Blood pressure 114/69, pulse 84, resp. rate 18, height 5' 3\" (1.6 m), weight 88.5 kg (195 lb).   Blood pressure 114/69, pulse 84, resp. rate 18, height 5' 3\" (1.6 m), weight 88.5 kg (195 lb).    General:  No acute distress.  HEENT: Mucosa moist, no scleral icterus  Lungs: Clear to auscultation  Cardiac: JVP  9 cm. No carotid bruit. RRR, Normal s1, s2, no s3.    trace left lower extremity edema. right below the knee amputation  Abdomen: Soft, non tender, BS+  Musculoskeletal: Gait normal. No tendon xanthoma.   Skin:  Warm, no cyanosis.  Neuro: A & O, nonfocal  Psychiatric: Normal affect.    LABORATORY:    Recent Labs    Lab Results   Component Value Date    CHOLESTEROL 139 10/24/2023    CHOLESTEROL 177 03/05/2020    CALCLDL 59 10/24/2023    CALCLDL 68 03/05/2020    HDL 36 (L) 10/24/2023    HDL 29 (L) 03/05/2020    TRIGLYCERIDE 219 (H) 10/24/2023    TRIGLYCERIDE 400 (H) 03/05/2020    GPT 20 10/24/2023    GPT 28 06/02/2020    SODIUM 138 11/20/2023    SODIUM 138 12/13/2021    POTASSIUM 3.7 11/20/2023    POTASSIUM 4.1 12/13/2021    CO2 30 11/20/2023    CO2 29 12/13/2021    BUN 26 (H) 11/20/2023    BUN 22 12/13/2021    CREATININE 1.16 (H) 11/20/2023    CREATININE 1.06 12/13/2021    TSH 0.488 10/24/2023    TSH 1.750 06/05/2020    BNP 92 07/27/2018            DIAGNOSTICS:  The following diagnostics were reviewed:    Echocardiogram June 2021: EF 45 to 50%. Concentric myocardial thickening. Aortic valve area 0.8, VTI ratio 0.23, mean gradient 23 mmHg.    Echocardiogram 2021: EF 43%.  Moderate aortic stenosis, mean gradient 21 mmHg, aortic valve area estimated 0.9 cm² aortic valve area index 0.5 cm²/m².  DVI ratio 0.28    BIV - pacing January 2022: A. fib burden 2.4%, longest lasting 26 hours.  Last episode November 3rd lasting 13 hours at 1.6%    Nuclear stress test: Large mid anterior apical reversible perfusion defect.    Cardiac catheterization 2018: Successful KARIME proximal LAD.      Cath 09/2021: Patent saphenous vein graft to distal LAD and posterolateral branch of the RCA. Mild Cx disease.         ASSESSMENT:  78 year old female with history of right below the knee amputation, coronary artery disease, two-vessel bypass surgery 2016, status post drug-eluting stent implant left anterior descending artery 2018, and medical treatment of moderate circumflex disease, patent SVG-LAD, SVG-RCA, heart failure, preserved ejection fraction with recent heart failure exacerbation, EF 45-50%, biventricular pacemaker implant with 98% paced rhythm,  s/p NIDA 11/2021, PAF on Eliquis, recent finger gangrene status post amputation, recent non-ST elevation myocardial infarction treated medically, recurrent hospitalizations for heart failure midrange ejection fraction, who maintains in New York Heart Association class III.  I encouraged the patient to continue her torsemide dosing as is.  Will decrease her Lyrica dosing to minimize any potential side effect of fluid retention.  Will continue with conservative medical strategies given patient request.         PLAN:    1.  Referral to nephrology service for proteinuria state.  2.  Continue current diuretic strategies.  3.  Follow-up in 3 months.      Joseph Tello MD, [facility]  Advanced Heart Failure and Pulmonary Hypertension  Heart Failure Medical Director [facility]

## 2024-06-19 ENCOUNTER — NON-APPOINTMENT (OUTPATIENT)
Age: 44
End: 2024-06-19

## 2024-10-03 NOTE — ASU PATIENT PROFILE, ADULT - FALL HARM RISK - UNIVERSAL INTERVENTIONS
Detail Level: Detailed
Quality 130: Documentation Of Current Medications In The Medical Record: Current Medications Documented
Bed in lowest position, wheels locked, appropriate side rails in place/Call bell, personal items and telephone in reach/Instruct patient to call for assistance before getting out of bed or chair/Non-slip footwear when patient is out of bed/[place] to call system/Physically safe environment - no spills, clutter or unnecessary equipment/Purposeful Proactive Rounding/Room/bathroom lighting operational, light cord in reach

## 2025-01-13 ENCOUNTER — NON-APPOINTMENT (OUTPATIENT)
Age: 45
End: 2025-01-13

## (undated) DEVICE — SUT POLYSORB 2-0 30" V-20 UNDYED

## (undated) DEVICE — DRAPE LIGHT HANDLE COVER (BLUE)

## (undated) DEVICE — SUT POLYSORB 0 30" GU-46

## (undated) DEVICE — TUBING SUCTION NONCONDUCTIVE 6MM X 12FT

## (undated) DEVICE — NDL HYPO SAFE 22G X 1" (BLACK)

## (undated) DEVICE — INSUFFLATION NDL COVIDIEN SURGINEEDLE VERESS 120MM

## (undated) DEVICE — SUT VICRYL PLUS 4-0 27" PS-2 UNDYED

## (undated) DEVICE — VENODYNE/SCD SLEEVE CALF MEDIUM

## (undated) DEVICE — DRSG BANDAID 0.75X3"

## (undated) DEVICE — PRECISION CUT SCISSOR MICROLINE MINI ENDOCUT DISP

## (undated) DEVICE — GOWN LG

## (undated) DEVICE — LINE BREATHE SAMPLNG

## (undated) DEVICE — SOL IRR POUR NS 0.9% 1500ML

## (undated) DEVICE — NDL HYPO REGULAR BEVEL 25G X 1.5" (BLUE)

## (undated) DEVICE — SYR LUER LOK 10CC

## (undated) DEVICE — SUT VICRYL PLUS 0 27" UR-6

## (undated) DEVICE — INJ SYS RAP REFIL

## (undated) DEVICE — DRAPE 1/2 SHEET 40X57"

## (undated) DEVICE — STAPLER SKIN VISI-STAT 35 WIDE

## (undated) DEVICE — DENTURE CUP PINK

## (undated) DEVICE — OMNIPAQUE 300  30ML

## (undated) DEVICE — DRSG MASTISOL

## (undated) DEVICE — ENDOCATCH 10MM SPECIMEN POUCH

## (undated) DEVICE — TROCAR COVIDIEN BLUNT TIP HASSAN 10MM

## (undated) DEVICE — ELCTR GROUNDING PAD ADULT COVIDIEN

## (undated) DEVICE — PACK IV START WITH CHG

## (undated) DEVICE — D HELP - CLEARVIEW CLEARIFY SYSTEM

## (undated) DEVICE — UNDERPAD LINEN SAVER 17 X 24"

## (undated) DEVICE — ELCTR FOOT CONTROL L WIRE LAPAROSCOPIC

## (undated) DEVICE — SUT POLYSORB 4-0 27" P-12 UNDYED

## (undated) DEVICE — DRSG 2X2

## (undated) DEVICE — BITE BLOCK ADULT 20 X 27MM (GREEN)

## (undated) DEVICE — ELCTR ECG CONDUCTIVE ADHESIVE

## (undated) DEVICE — DRAIN JACKSON PRATT 10MM FLAT 3/4 NO TROCAR

## (undated) DEVICE — SPONGE ENDO PEANUT 5MM

## (undated) DEVICE — FOR-ESU VALLEYLAB T7E14999DX: Type: DURABLE MEDICAL EQUIPMENT

## (undated) DEVICE — SOL IRR POUR NS 0.9% 500ML

## (undated) DEVICE — TUBING STRYKER PNEUMOSURE HEATED RTP

## (undated) DEVICE — PACK GENERAL LAPAROSCOPY

## (undated) DEVICE — CATH IV SAFE BC 22G X 1" (BLUE)

## (undated) DEVICE — SYR LUER LOK 3CC

## (undated) DEVICE — TROCAR COVIDIEN VERSAONE BLADED SMOOTH 11MM STANDARD

## (undated) DEVICE — DRAIN RESERVOIR FOR JACKSON PRATT 100CC CARDINAL

## (undated) DEVICE — DRSG CURITY GAUZE SPONGE 4 X 4" 12-PLY NON-STERILE

## (undated) DEVICE — TROCAR COVIDIEN VERSAONE BLADED SMOOTH 5MM STANDARD

## (undated) DEVICE — TUBING STRYKEFLOW II SUCTION / IRRIGATOR

## (undated) DEVICE — SENSOR O2 FINGER ADULT

## (undated) DEVICE — SOL INJ NS 0.9% 500ML 1-PORT

## (undated) DEVICE — SALIVA EJECTOR (BLUE)

## (undated) DEVICE — BASIN EMESIS 10IN GRADUATED MAUVE

## (undated) DEVICE — SUT MAXON 0 36" T-12